# Patient Record
Sex: FEMALE | Race: WHITE | NOT HISPANIC OR LATINO | URBAN - METROPOLITAN AREA
[De-identification: names, ages, dates, MRNs, and addresses within clinical notes are randomized per-mention and may not be internally consistent; named-entity substitution may affect disease eponyms.]

---

## 2020-10-29 NOTE — PATIENT DISCUSSION
PLAN: CE/IOL OD THEN OS, GOAL CASTRO, PT MAY BE INTERESTED IN CV OR AV.  WILL SPEAK WITH Marco Roman.

## 2020-11-16 NOTE — PATIENT DISCUSSION
*** 11/3/20 PER PT DISCUSSION W/ [facility], PT ELECTS AV OFFSET, OD TMF, OS SYMF.  GEMA.

## 2020-11-17 NOTE — PATIENT DISCUSSION
*** 11/3/20 PER PT DISCUSSION W/ [facility], PT ELECTS AV OFFSET, OD TMF, OS SYMF.  GEMA.

## 2020-11-20 NOTE — PATIENT DISCUSSION
*** 11/3/20 PER PT DISCUSSION W/ [facility], PT ELECTS AV OFFSET, OD TMF, OS SYMF.  GEMA.

## 2020-11-20 NOTE — PATIENT DISCUSSION
PLAN: CE/IOL OD THEN OS, GOAL CASTRO, PT MAY BE INTERESTED IN CV OR AV.  WILL SPEAK WITH [facility].

## 2020-11-23 NOTE — PATIENT DISCUSSION
*** 11/3/20 PER PT DISCUSSION W/ [facility], PT ELECTS AV OFFSET, OD TMF, OS SYMF.  GEMA.

## 2020-11-23 NOTE — PATIENT DISCUSSION
*** 11/3/20 PER PT DISCUSSION W/ [facility], PT ELECTS AV OFFSET, OD TMF, OS SYMF.  GEMA.

## 2020-11-24 NOTE — PATIENT DISCUSSION
PLAN: CE/IOL OD THEN OS, GOAL CASTRO, PT MAY BE INTERESTED IN CV OR AV.  WILL SPEAK WITH [facility].

## 2020-12-09 NOTE — PATIENT DISCUSSION
PLAN: CE/IOL OD THEN OS, GOAL CASTRO, PT MAY BE INTERESTED IN CV OR AV.  WILL SPEAK WITH [facility].

## 2021-03-09 NOTE — PATIENT DISCUSSION
PLAN: CE/IOL OD THEN OS, GOAL CASTRO, PT MAY BE INTERESTED IN CV OR AV.  WILL SPEAK WITH [facility].

## 2021-05-05 NOTE — PATIENT DISCUSSION
PLAN: CE/IOL OD THEN OS, GOAL CASTRO, PT MAY BE INTERESTED IN CV OR AV.  WILL SPEAK WITH [facility].

## 2021-05-05 NOTE — PATIENT DISCUSSION
*** 11/3/20 PER PT DISCUSSION W/ [facility], PT ELECTS AV OFFSET, OD TMF, OS SYMF.  GEMA.

## 2021-05-05 NOTE — PATIENT DISCUSSION
Residual NOÉ. Better vision with trial frame. Follow with Dr. Layla Clement for probable YAG then LASIK.

## 2022-03-01 ENCOUNTER — NEW PATIENT (OUTPATIENT)
Dept: URBAN - METROPOLITAN AREA CLINIC 52 | Facility: CLINIC | Age: 72
End: 2022-03-01

## 2022-03-01 DIAGNOSIS — H25.813: ICD-10-CM

## 2022-03-01 DIAGNOSIS — H43.813: ICD-10-CM

## 2022-03-01 DIAGNOSIS — H16.223: ICD-10-CM

## 2022-03-01 DIAGNOSIS — H40.013: ICD-10-CM

## 2022-03-01 DIAGNOSIS — H52.4: ICD-10-CM

## 2022-03-01 PROCEDURE — 92015 DETERMINE REFRACTIVE STATE: CPT

## 2022-03-01 PROCEDURE — 92004 COMPRE OPH EXAM NEW PT 1/>: CPT

## 2022-03-01 PROCEDURE — 92134 CPTRZ OPH DX IMG PST SGM RTA: CPT

## 2022-03-01 ASSESSMENT — KERATOMETRY
OS_K1POWER_DIOPTERS: 43.25
OD_K2POWER_DIOPTERS: 44.00
OD_AXISANGLE_DEGREES: 0
OS_AXISANGLE2_DEGREES: 133
OS_AXISANGLE_DEGREES: 43
OD_K1POWER_DIOPTERS: 44.00
OD_AXISANGLE2_DEGREES: 90
OS_K2POWER_DIOPTERS: 44.00

## 2022-03-01 ASSESSMENT — VISUAL ACUITY
OS_GLARE: 20/40
OD_GLARE: 20/50
OS_GLARE: 20/70
OU_SC: J1+
OD_GLARE: 20/40
OU_SC: 20/50-1
OS_SC: 20/50-2
OS_PH: 20/30-1
OD_PH: 20/25
OD_SC: 20/60-1
OS_SC: J1
OD_SC: J1

## 2022-03-01 ASSESSMENT — TONOMETRY
OS_IOP_MMHG: 18
OD_IOP_MMHG: 18

## 2022-03-01 NOTE — PATIENT DISCUSSION
GLAUCOMA SUSPECT-C/D: The patient is a glaucoma suspect because of suspicious appearance of the optic disc(s)OD>OS. IOP 18/18 WNL. () family HX. RTC for HVF/OCT RNFL at patient convenience. [facility] with tech at comprehensive exam in 1 year.

## 2022-04-18 NOTE — PATIENT DISCUSSION
*** 11/3/20 PER PT DISCUSSION W/ [address], PT ELECTS AV OFFSET, OD TMF, OS SYMF.  GEMA.
+/- bill for teto.
CATARACT SURGERY PLANNED FOR TODAY - OD.
Discussed condition and exacerbating conditions/situations (e.g., dry/arid environments, overhead fans, air conditioners, side effect of medications).
Discussed lid hygiene, warm compress and eyelid wash.
Monitor.
PLAN: CE/IOL OD THEN OS, GOAL CASTRO, PT MAY BE INTERESTED IN CV OR AV.  WILL SPEAK WITH Marco Roman.
PO W/ CFS OD, NO VAN, PT WANTS TO PRICE DROPS AND WILL CALL BACK.
Post op gtt instructions reviewed with patient.
Pt is a candidate for all lens options.
Recommended artificial tears to use: 1 drop 4x a day in both eyes.
Recommended observation.
The patient feels that the cataract is significantly impacting daily activities and has elected cataract surgery. The risks, benefits, and alternatives to surgery were discussed. The patient elects to proceed with surgery.
The types of intraocular lenses were reviewed with the patient along with a discussion of their various strengths and weaknesses.
Will monitor.
Yes

## 2022-06-06 ENCOUNTER — DIAGNOSTICS ONLY (OUTPATIENT)
Dept: URBAN - METROPOLITAN AREA CLINIC 50 | Facility: CLINIC | Age: 72
End: 2022-06-06

## 2022-06-06 DIAGNOSIS — H40.013: ICD-10-CM

## 2022-06-06 PROCEDURE — 92083 EXTENDED VISUAL FIELD XM: CPT

## 2022-06-06 PROCEDURE — 92133 CPTRZD OPH DX IMG PST SGM ON: CPT

## 2022-06-06 ASSESSMENT — KERATOMETRY
OS_K1POWER_DIOPTERS: 43.25
OS_AXISANGLE2_DEGREES: 133
OD_K1POWER_DIOPTERS: 44.00
OS_K2POWER_DIOPTERS: 44.00
OD_K2POWER_DIOPTERS: 44.00
OD_AXISANGLE2_DEGREES: 90
OD_AXISANGLE_DEGREES: 0
OS_AXISANGLE_DEGREES: 43

## 2022-06-06 NOTE — PATIENT DISCUSSION
GLAUCOMA SUSPECT-C/D: The patient is a glaucoma suspect because of suspicious appearance of the optic disc(s)OD>OS. IOP 18/18 WNL. () family HX. RTC for HVF/OCT RNFL at patient convenience. [facility] with tech at comprehensive exam in 1 year.

## 2022-06-06 NOTE — PATIENT DISCUSSION
OCT RNFL (06/2022) WNL OU. HVF (06/2022) non-glaucomatous OU. Will call patient with results. Recommend comprehensive exam in 9 months.

## 2023-03-06 NOTE — PATIENT DISCUSSION
Drop regiment discussed with patient. Start PF tears OU 2-3x/day or as needed. Start warm compresses OU BID. Start lid scrubs OU BID. Detail Level: Zone Hide Include Location In Plan Question?: No Include Location In Plan?: Yes Detail Level: Simple

## 2023-08-07 ENCOUNTER — COMPREHENSIVE EXAM (OUTPATIENT)
Dept: URBAN - METROPOLITAN AREA CLINIC 50 | Facility: LOCATION | Age: 73
End: 2023-08-07

## 2023-08-07 DIAGNOSIS — H43.813: ICD-10-CM

## 2023-08-07 DIAGNOSIS — H40.013: ICD-10-CM

## 2023-08-07 DIAGNOSIS — H16.223: ICD-10-CM

## 2023-08-07 DIAGNOSIS — H25.813: ICD-10-CM

## 2023-08-07 PROCEDURE — 76514 ECHO EXAM OF EYE THICKNESS: CPT

## 2023-08-07 PROCEDURE — 92014 COMPRE OPH EXAM EST PT 1/>: CPT

## 2023-08-07 PROCEDURE — 92015 DETERMINE REFRACTIVE STATE: CPT

## 2023-08-07 PROCEDURE — 92134 CPTRZ OPH DX IMG PST SGM RTA: CPT

## 2023-08-07 ASSESSMENT — PACHYMETRY
OD_CT_UM: 589
OS_CT_UM: 593

## 2023-08-07 ASSESSMENT — TONOMETRY
OD_IOP_MMHG: 16
OS_IOP_MMHG: 12
OD_IOP_MMHG: 13
OS_IOP_MMHG: 16

## 2023-08-07 ASSESSMENT — VISUAL ACUITY
OS_GLARE: 20/70
OD_CC: 20/25-1
OS_GLARE: 20/50
OU_SC: J1@14"
OS_PH: 20/25
OU_CC: 20/25
OD_GLARE: 20/70
OD_GLARE: 20/40
OS_CC: 20/30-2 PUSH

## 2023-08-21 ENCOUNTER — PRE-OP/H&P (OUTPATIENT)
Dept: URBAN - METROPOLITAN AREA CLINIC 50 | Facility: LOCATION | Age: 73
End: 2023-08-21

## 2023-08-21 DIAGNOSIS — H25.813: ICD-10-CM

## 2023-08-21 PROCEDURE — 92136 OPHTHALMIC BIOMETRY: CPT

## 2023-08-21 PROCEDURE — PREOP PRE OP VISIT

## 2023-08-21 PROCEDURE — 92025CV CORNEAL TOPOGRAPHY, CV

## 2023-08-21 ASSESSMENT — TONOMETRY
OD_IOP_MMHG: 15
OS_IOP_MMHG: 11
OS_IOP_MMHG: 15
OD_IOP_MMHG: 12

## 2023-08-21 ASSESSMENT — KERATOMETRY
OD_K2POWER_DIOPTERS: 43.12
OS_K1POWER_DIOPTERS: 43.00
OD_AXISANGLE_DEGREES: 180
OD_K1POWER_DIOPTERS: 43.62
OS_K2POWER_DIOPTERS: 42.75
OS_AXISANGLE_DEGREES: 178
OD_AXISANGLE2_DEGREES: 90
OS_AXISANGLE2_DEGREES: 88

## 2023-08-21 ASSESSMENT — VISUAL ACUITY
OD_CC: 20/30-1
OS_PH: 20/40
OS_CC: 20/50-2

## 2023-08-31 ENCOUNTER — POST-OP (OUTPATIENT)
Dept: URBAN - METROPOLITAN AREA CLINIC 50 | Facility: LOCATION | Age: 73
End: 2023-08-31

## 2023-08-31 ENCOUNTER — SURGERY/PROCEDURE (OUTPATIENT)
Dept: URBAN - METROPOLITAN AREA SURGERY 16 | Facility: SURGERY | Age: 73
End: 2023-08-31

## 2023-08-31 DIAGNOSIS — H25.812: ICD-10-CM

## 2023-08-31 DIAGNOSIS — Z96.1: ICD-10-CM

## 2023-08-31 PROCEDURE — 66984CV REMOVE CATARACT, INSERT LENS, CUSTOM VISION

## 2023-08-31 PROCEDURE — 99199PCV CUSTOM VISION

## 2023-08-31 ASSESSMENT — KERATOMETRY
OS_AXISANGLE_DEGREES: 178
OD_K2POWER_DIOPTERS: 43.12
OD_AXISANGLE_DEGREES: 180
OD_AXISANGLE_DEGREES: 180
OS_AXISANGLE2_DEGREES: 88
OD_K2POWER_DIOPTERS: 43.12
OS_K2POWER_DIOPTERS: 42.75
OD_K1POWER_DIOPTERS: 43.62
OS_K2POWER_DIOPTERS: 42.75
OS_K1POWER_DIOPTERS: 43.00
OD_AXISANGLE2_DEGREES: 90
OS_K1POWER_DIOPTERS: 43.00
OS_AXISANGLE_DEGREES: 178
OD_K1POWER_DIOPTERS: 43.62
OS_AXISANGLE2_DEGREES: 88
OD_AXISANGLE2_DEGREES: 90

## 2023-08-31 ASSESSMENT — VISUAL ACUITY
OS_PH: 20/40
OS_SC: 20/50-1

## 2023-08-31 ASSESSMENT — TONOMETRY: OS_IOP_MMHG: 25

## 2023-09-11 ENCOUNTER — POST OP/EVAL OF SECOND EYE (OUTPATIENT)
Dept: URBAN - METROPOLITAN AREA CLINIC 50 | Facility: LOCATION | Age: 73
End: 2023-09-11

## 2023-09-11 DIAGNOSIS — H25.811: ICD-10-CM

## 2023-09-11 DIAGNOSIS — Z98.42: ICD-10-CM

## 2023-09-11 DIAGNOSIS — Z96.1: ICD-10-CM

## 2023-09-11 PROCEDURE — 92136 OPHTHALMIC BIOMETRY: CPT

## 2023-09-11 PROCEDURE — 99213 OFFICE O/P EST LOW 20 MIN: CPT

## 2023-09-11 ASSESSMENT — TONOMETRY
OS_IOP_MMHG: 11
OS_IOP_MMHG: 15
OD_IOP_MMHG: 12
OD_IOP_MMHG: 15

## 2023-09-11 ASSESSMENT — VISUAL ACUITY
OS_SC: 20/25 @ 21"
OS_SC: 20/30

## 2023-09-21 ENCOUNTER — POST-OP (OUTPATIENT)
Dept: URBAN - METROPOLITAN AREA CLINIC 50 | Facility: LOCATION | Age: 73
End: 2023-09-21

## 2023-09-21 ENCOUNTER — SURGERY/PROCEDURE (OUTPATIENT)
Dept: URBAN - METROPOLITAN AREA SURGERY 16 | Facility: SURGERY | Age: 73
End: 2023-09-21

## 2023-09-21 DIAGNOSIS — Z96.1: ICD-10-CM

## 2023-09-21 DIAGNOSIS — H25.811: ICD-10-CM

## 2023-09-21 DIAGNOSIS — Z98.41: ICD-10-CM

## 2023-09-21 PROCEDURE — 66984CV REMOVE CATARACT, INSERT LENS, CUSTOM VISION

## 2023-09-21 ASSESSMENT — VISUAL ACUITY: OD_SC: 20/40+2

## 2023-09-21 ASSESSMENT — TONOMETRY
OD_IOP_MMHG: 20
OD_IOP_MMHG: 17

## 2023-09-25 ENCOUNTER — POST-OP (OUTPATIENT)
Dept: URBAN - METROPOLITAN AREA CLINIC 50 | Facility: LOCATION | Age: 73
End: 2023-09-25

## 2023-09-25 DIAGNOSIS — Z96.1: ICD-10-CM

## 2023-09-25 DIAGNOSIS — Z98.41: ICD-10-CM

## 2023-09-25 PROCEDURE — 99024 POSTOP FOLLOW-UP VISIT: CPT

## 2023-09-25 ASSESSMENT — TONOMETRY
OD_IOP_MMHG: 15
OS_IOP_MMHG: 14

## 2023-09-25 ASSESSMENT — VISUAL ACUITY
OU_SC: J1
OS_SC: 20/25-2
OD_SC: 20/25

## 2023-10-30 ENCOUNTER — POST-OP (OUTPATIENT)
Dept: URBAN - METROPOLITAN AREA CLINIC 50 | Facility: LOCATION | Age: 73
End: 2023-10-30

## 2023-10-30 DIAGNOSIS — H43.813: ICD-10-CM

## 2023-10-30 DIAGNOSIS — Z98.41: ICD-10-CM

## 2023-10-30 DIAGNOSIS — H04.123: ICD-10-CM

## 2023-10-30 DIAGNOSIS — Z98.42: ICD-10-CM

## 2023-10-30 PROCEDURE — 92015 DETERMINE REFRACTIVE STATE: CPT

## 2023-10-30 PROCEDURE — 99024 POSTOP FOLLOW-UP VISIT: CPT

## 2023-10-30 ASSESSMENT — TONOMETRY
OD_IOP_MMHG: 12
OS_IOP_MMHG: 12

## 2023-10-30 ASSESSMENT — VISUAL ACUITY
OU_SC: J1
OD_SC: 20/20-1
OU_SC: 20/20
OS_SC: 20/20

## 2024-04-29 ENCOUNTER — COMPREHENSIVE EXAM (OUTPATIENT)
Dept: URBAN - METROPOLITAN AREA CLINIC 50 | Facility: LOCATION | Age: 74
End: 2024-04-29

## 2024-04-29 DIAGNOSIS — H26.493: ICD-10-CM

## 2024-04-29 DIAGNOSIS — Z96.1: ICD-10-CM

## 2024-04-29 PROCEDURE — 99214 OFFICE O/P EST MOD 30 MIN: CPT

## 2024-04-29 ASSESSMENT — TONOMETRY
OD_IOP_MMHG: 16
OS_IOP_MMHG: 15

## 2024-04-29 ASSESSMENT — VISUAL ACUITY
OU_SC: 20/25+1
OS_SC: 20/30-1
OU_SC: J1@16"
OS_GLARE: 20/30
OD_GLARE: 20/40
OS_GLARE: 20/25
OD_GLARE: 20/30
OD_SC: 20/25-2
OS_PH: 20/25-1

## 2024-10-28 ENCOUNTER — CONSULTATION/EVALUATION (OUTPATIENT)
Dept: URBAN - METROPOLITAN AREA CLINIC 50 | Facility: LOCATION | Age: 74
End: 2024-10-28

## 2024-10-28 DIAGNOSIS — H16.223: ICD-10-CM

## 2024-10-28 DIAGNOSIS — H40.013: ICD-10-CM

## 2024-10-28 DIAGNOSIS — Z96.1: ICD-10-CM

## 2024-10-28 DIAGNOSIS — H26.493: ICD-10-CM

## 2024-10-28 PROCEDURE — 99214 OFFICE O/P EST MOD 30 MIN: CPT

## 2025-02-07 ENCOUNTER — DIAGNOSTICS ONLY (OUTPATIENT)
Age: 75
End: 2025-02-07

## 2025-02-07 DIAGNOSIS — H40.013: ICD-10-CM

## 2025-02-07 PROCEDURE — 92133 CPTRZD OPH DX IMG PST SGM ON: CPT

## 2025-02-07 PROCEDURE — 92083 EXTENDED VISUAL FIELD XM: CPT
